# Patient Record
Sex: MALE | Race: WHITE | NOT HISPANIC OR LATINO | Employment: OTHER | URBAN - NONMETROPOLITAN AREA
[De-identification: names, ages, dates, MRNs, and addresses within clinical notes are randomized per-mention and may not be internally consistent; named-entity substitution may affect disease eponyms.]

---

## 2017-04-28 ENCOUNTER — TRANSCRIBE ORDERS (OUTPATIENT)
Dept: LAB | Facility: HOSPITAL | Age: 72
End: 2017-04-28

## 2018-04-19 ENCOUNTER — TELEPHONE (OUTPATIENT)
Dept: FAMILY MEDICINE CLINIC | Facility: CLINIC | Age: 73
End: 2018-04-19

## 2020-01-01 ENCOUNTER — APPOINTMENT (OUTPATIENT)
Dept: GENERAL RADIOLOGY | Facility: HOSPITAL | Age: 75
End: 2020-01-01

## 2020-01-01 ENCOUNTER — HOSPITAL ENCOUNTER (EMERGENCY)
Facility: HOSPITAL | Age: 75
Discharge: HOME | End: 2020-01-08

## 2020-01-01 ENCOUNTER — HOSPITAL ENCOUNTER (INPATIENT)
Facility: HOSPITAL | Age: 75
LOS: 1 days | End: 2020-01-09
Attending: EMERGENCY MEDICINE | Admitting: HOSPITALIST

## 2020-01-01 VITALS
WEIGHT: 146.83 LBS | TEMPERATURE: 99.7 F | SYSTOLIC BLOOD PRESSURE: 80 MMHG | HEIGHT: 72 IN | DIASTOLIC BLOOD PRESSURE: 44 MMHG | RESPIRATION RATE: 27 BRPM | OXYGEN SATURATION: 75 % | BODY MASS INDEX: 19.89 KG/M2

## 2020-01-01 VITALS — HEART RATE: 83 BPM | RESPIRATION RATE: 12 BRPM

## 2020-01-01 DIAGNOSIS — I95.9 HYPOTENSION, UNSPECIFIED HYPOTENSION TYPE: ICD-10-CM

## 2020-01-01 DIAGNOSIS — E87.1 HYPONATREMIA: ICD-10-CM

## 2020-01-01 DIAGNOSIS — T68.XXXA HYPOTHERMIA, INITIAL ENCOUNTER: ICD-10-CM

## 2020-01-01 DIAGNOSIS — A41.9 SEPSIS, DUE TO UNSPECIFIED ORGANISM, UNSPECIFIED WHETHER ACUTE ORGAN DYSFUNCTION PRESENT (HCC): ICD-10-CM

## 2020-01-01 DIAGNOSIS — K92.2 GASTROINTESTINAL HEMORRHAGE, UNSPECIFIED GASTROINTESTINAL HEMORRHAGE TYPE: Primary | ICD-10-CM

## 2020-01-01 LAB
ABO GROUP BLD: NORMAL
ACANTHOCYTES BLD QL SMEAR: ABNORMAL
ALBUMIN SERPL-MCNC: 1.6 G/DL (ref 3.5–5.2)
ALBUMIN/GLOB SERPL: 0.8 G/DL
ALP SERPL-CCNC: 56 U/L (ref 39–117)
ALT SERPL W P-5'-P-CCNC: 20 U/L (ref 1–41)
ANION GAP SERPL CALCULATED.3IONS-SCNC: 16 MMOL/L (ref 5–15)
ANION GAP SERPL CALCULATED.3IONS-SCNC: 20 MMOL/L (ref 5–15)
ARTERIAL PATENCY WRIST A: ABNORMAL
AST SERPL-CCNC: 21 U/L (ref 1–40)
ATMOSPHERIC PRESS: 755 MMHG
ATMOSPHERIC PRESS: 755 MMHG
ATMOSPHERIC PRESS: 757 MMHG
BACTERIA UR QL AUTO: ABNORMAL /HPF
BASE EXCESS BLDA CALC-SCNC: -10.7 MMOL/L (ref 0–2)
BASE EXCESS BLDA CALC-SCNC: -12.7 MMOL/L (ref 0–2)
BASE EXCESS BLDA CALC-SCNC: -22.5 MMOL/L (ref 0–2)
BASOPHILS # BLD AUTO: 0.02 10*3/MM3 (ref 0–0.2)
BASOPHILS NFR BLD AUTO: 0.1 % (ref 0–1.5)
BDY SITE: ABNORMAL
BILIRUB SERPL-MCNC: 0.2 MG/DL (ref 0.2–1.2)
BILIRUB UR QL STRIP: NEGATIVE
BLD GP AB SCN SERPL QL: NEGATIVE
BUN BLD-MCNC: 71 MG/DL (ref 8–23)
BUN BLD-MCNC: 82 MG/DL (ref 8–23)
BUN/CREAT SERPL: 33.6 (ref 7–25)
BUN/CREAT SERPL: 34.7 (ref 7–25)
CALCIUM SPEC-SCNC: 6.9 MG/DL (ref 8.6–10.5)
CALCIUM SPEC-SCNC: 7.8 MG/DL (ref 8.6–10.5)
CHLORIDE SERPL-SCNC: 78 MMOL/L (ref 98–107)
CHLORIDE SERPL-SCNC: 86 MMOL/L (ref 98–107)
CLARITY UR: ABNORMAL
CO2 SERPL-SCNC: 16 MMOL/L (ref 22–29)
CO2 SERPL-SCNC: 9 MMOL/L (ref 22–29)
COLOR UR: YELLOW
CREAT BLD-MCNC: 2.11 MG/DL (ref 0.76–1.27)
CREAT BLD-MCNC: 2.36 MG/DL (ref 0.76–1.27)
D-LACTATE SERPL-SCNC: 4.6 MMOL/L (ref 0.5–2)
D-LACTATE SERPL-SCNC: 7.2 MMOL/L (ref 0.5–2)
DEPRECATED RDW RBC AUTO: 36.2 FL (ref 37–54)
DEPRECATED RDW RBC AUTO: 37.6 FL (ref 37–54)
EOSINOPHIL # BLD AUTO: 0 10*3/MM3 (ref 0–0.4)
EOSINOPHIL NFR BLD AUTO: 0 % (ref 0.3–6.2)
ERYTHROCYTE [DISTWIDTH] IN BLOOD BY AUTOMATED COUNT: 11.8 % (ref 12.3–15.4)
ERYTHROCYTE [DISTWIDTH] IN BLOOD BY AUTOMATED COUNT: 12.5 % (ref 12.3–15.4)
GFR SERPL CREATININE-BSD FRML MDRD: 27 ML/MIN/1.73
GFR SERPL CREATININE-BSD FRML MDRD: 31 ML/MIN/1.73
GLOBULIN UR ELPH-MCNC: 1.9 GM/DL
GLUCOSE BLD-MCNC: 108 MG/DL (ref 65–99)
GLUCOSE BLD-MCNC: 325 MG/DL (ref 65–99)
GLUCOSE UR STRIP-MCNC: NEGATIVE MG/DL
HCO3 BLDA-SCNC: 11.8 MMOL/L (ref 20–26)
HCO3 BLDA-SCNC: 13.7 MMOL/L (ref 20–26)
HCO3 BLDA-SCNC: 8.2 MMOL/L (ref 20–26)
HCT VFR BLD AUTO: 26.7 % (ref 37.5–51)
HCT VFR BLD AUTO: 48.9 % (ref 37.5–51)
HGB BLD-MCNC: 18 G/DL (ref 13–17.7)
HGB BLD-MCNC: 9.7 G/DL (ref 13–17.7)
HGB UR QL STRIP.AUTO: ABNORMAL
HOLD SPECIMEN: NORMAL
HOROWITZ INDEX BLD+IHG-RTO: 30 %
HOROWITZ INDEX BLD+IHG-RTO: 60 %
HOROWITZ INDEX BLD+IHG-RTO: 60 %
HYALINE CASTS UR QL AUTO: ABNORMAL /LPF
IMM GRANULOCYTES # BLD AUTO: 0.45 10*3/MM3 (ref 0–0.05)
IMM GRANULOCYTES NFR BLD AUTO: 1.5 % (ref 0–0.5)
INR PPP: 1.59 (ref 0.8–1.2)
KETONES UR QL STRIP: NEGATIVE
LARGE PLATELETS: ABNORMAL
LEUKOCYTE ESTERASE UR QL STRIP.AUTO: ABNORMAL
LYMPHOCYTES # BLD AUTO: 0.36 10*3/MM3 (ref 0.7–3.1)
LYMPHOCYTES # BLD MANUAL: 1.85 10*3/MM3 (ref 0.7–3.1)
LYMPHOCYTES NFR BLD AUTO: 1.2 % (ref 19.6–45.3)
LYMPHOCYTES NFR BLD MANUAL: 5 % (ref 5–12)
LYMPHOCYTES NFR BLD MANUAL: 6 % (ref 19.6–45.3)
Lab: ABNORMAL
Lab: NORMAL
MAGNESIUM SERPL-MCNC: 1.9 MG/DL (ref 1.6–2.4)
MCH RBC QN AUTO: 30.8 PG (ref 26.6–33)
MCH RBC QN AUTO: 30.9 PG (ref 26.6–33)
MCHC RBC AUTO-ENTMCNC: 36.3 G/DL (ref 31.5–35.7)
MCHC RBC AUTO-ENTMCNC: 36.8 G/DL (ref 31.5–35.7)
MCV RBC AUTO: 83.7 FL (ref 79–97)
MCV RBC AUTO: 85 FL (ref 79–97)
METAMYELOCYTES NFR BLD MANUAL: 2 % (ref 0–0)
MODALITY: ABNORMAL
MONOCYTES # BLD AUTO: 0.77 10*3/MM3 (ref 0.1–0.9)
MONOCYTES # BLD AUTO: 1.54 10*3/MM3 (ref 0.1–0.9)
MONOCYTES NFR BLD AUTO: 2.6 % (ref 5–12)
NEUTROPHILS # BLD AUTO: 26.77 10*3/MM3 (ref 1.7–7)
NEUTROPHILS # BLD AUTO: 28.31 10*3/MM3 (ref 1.7–7)
NEUTROPHILS NFR BLD AUTO: 94.6 % (ref 42.7–76)
NEUTROPHILS NFR BLD MANUAL: 80 % (ref 42.7–76)
NEUTS BAND NFR BLD MANUAL: 7 % (ref 0–5)
NITRITE UR QL STRIP: POSITIVE
NRBC BLD AUTO-RTO: 0.1 /100 WBC (ref 0–0.2)
NT-PROBNP SERPL-MCNC: 821.2 PG/ML (ref 5–900)
PAW @ PEAK INSP FLOW SETTING VENT: 23 CMH2O
PCO2 BLDA: 25 MM HG (ref 35–45)
PCO2 BLDA: 27.1 MM HG (ref 35–45)
PCO2 BLDA: 35.8 MM HG (ref 35–45)
PEEP RESPIRATORY: 5 CM[H2O]
PH BLDA: 6.97 PH UNITS (ref 7.35–7.45)
PH BLDA: 7.28 PH UNITS (ref 7.35–7.45)
PH BLDA: 7.31 PH UNITS (ref 7.35–7.45)
PH UR STRIP.AUTO: 5.5 [PH] (ref 5–9)
PLATELET # BLD AUTO: 231 10*3/MM3 (ref 140–450)
PLATELET # BLD AUTO: 235 10*3/MM3 (ref 140–450)
PMV BLD AUTO: 9.6 FL (ref 6–12)
PMV BLD AUTO: 9.9 FL (ref 6–12)
PO2 BLDA: 126 MM HG (ref 83–108)
PO2 BLDA: 203 MM HG (ref 83–108)
PO2 BLDA: 276 MM HG (ref 83–108)
POLYCHROMASIA BLD QL SMEAR: ABNORMAL
POTASSIUM BLD-SCNC: 4.8 MMOL/L (ref 3.5–5.2)
POTASSIUM BLD-SCNC: 5.5 MMOL/L (ref 3.5–5.2)
PROT SERPL-MCNC: 3.5 G/DL (ref 6–8.5)
PROT UR QL STRIP: NEGATIVE
PROTHROMBIN TIME: 18.7 SECONDS (ref 11.1–15.3)
RBC # BLD AUTO: 3.14 10*6/MM3 (ref 4.14–5.8)
RBC # BLD AUTO: 5.84 10*6/MM3 (ref 4.14–5.8)
RBC # UR: ABNORMAL /HPF
REF LAB TEST METHOD: ABNORMAL
RH BLD: POSITIVE
SAO2 % BLDCOA: 98.4 % (ref 94–99)
SAO2 % BLDCOA: 98.7 % (ref 94–99)
SAO2 % BLDCOA: 99.7 % (ref 94–99)
SET MECH RESP RATE: 12
SODIUM BLD-SCNC: 107 MMOL/L (ref 136–145)
SODIUM BLD-SCNC: 118 MMOL/L (ref 136–145)
SP GR UR STRIP: 1.02 (ref 1–1.03)
SQUAMOUS #/AREA URNS HPF: ABNORMAL /HPF
T&S EXPIRATION DATE: NORMAL
TROPONIN T SERPL-MCNC: 0.01 NG/ML (ref 0–0.03)
UROBILINOGEN UR QL STRIP: ABNORMAL
VENTILATOR MODE: AC
VT ON VENT VENT: 500 ML
VT ON VENT VENT: 500 ML
WBC MORPH BLD: NORMAL
WBC NRBC COR # BLD: 29.91 10*3/MM3 (ref 3.4–10.8)
WBC NRBC COR # BLD: 30.77 10*3/MM3 (ref 3.4–10.8)
WBC UR QL AUTO: ABNORMAL /HPF
WHOLE BLOOD HOLD SPECIMEN: NORMAL
WHOLE BLOOD HOLD SPECIMEN: NORMAL
YEAST URNS QL MICRO: ABNORMAL /HPF

## 2020-01-01 PROCEDURE — 36600 WITHDRAWAL OF ARTERIAL BLOOD: CPT

## 2020-01-01 PROCEDURE — 85025 COMPLETE CBC W/AUTO DIFF WBC: CPT | Performed by: HOSPITALIST

## 2020-01-01 PROCEDURE — 85007 BL SMEAR W/DIFF WBC COUNT: CPT | Performed by: EMERGENCY MEDICINE

## 2020-01-01 PROCEDURE — 86850 RBC ANTIBODY SCREEN: CPT | Performed by: EMERGENCY MEDICINE

## 2020-01-01 PROCEDURE — 87181 SC STD AGAR DILUTION PER AGT: CPT | Performed by: HOSPITALIST

## 2020-01-01 PROCEDURE — 82803 BLOOD GASES ANY COMBINATION: CPT

## 2020-01-01 PROCEDURE — 87205 SMEAR GRAM STAIN: CPT | Performed by: HOSPITALIST

## 2020-01-01 PROCEDURE — 25010000002 MIDAZOLAM 50 MG/10ML SOLUTION 10 ML VIAL: Performed by: HOSPITALIST

## 2020-01-01 PROCEDURE — 85025 COMPLETE CBC W/AUTO DIFF WBC: CPT | Performed by: EMERGENCY MEDICINE

## 2020-01-01 PROCEDURE — 94762 N-INVAS EAR/PLS OXIMTRY CONT: CPT

## 2020-01-01 PROCEDURE — 94799 UNLISTED PULMONARY SVC/PX: CPT

## 2020-01-01 PROCEDURE — 25010000002 CEFTRIAXONE PER 250 MG: Performed by: EMERGENCY MEDICINE

## 2020-01-01 PROCEDURE — 25010000002 EPINEPHRINE PF 1 MG/10ML SOLUTION PREFILLED SYRINGE: Performed by: INTERNAL MEDICINE

## 2020-01-01 PROCEDURE — 87070 CULTURE OTHR SPECIMN AEROBIC: CPT | Performed by: HOSPITALIST

## 2020-01-01 PROCEDURE — 80053 COMPREHEN METABOLIC PANEL: CPT | Performed by: EMERGENCY MEDICINE

## 2020-01-01 PROCEDURE — 87086 URINE CULTURE/COLONY COUNT: CPT | Performed by: EMERGENCY MEDICINE

## 2020-01-01 PROCEDURE — 86920 COMPATIBILITY TEST SPIN: CPT

## 2020-01-01 PROCEDURE — P9016 RBC LEUKOCYTES REDUCED: HCPCS

## 2020-01-01 PROCEDURE — 93010 ELECTROCARDIOGRAM REPORT: CPT | Performed by: INTERNAL MEDICINE

## 2020-01-01 PROCEDURE — 25010000002 ATROPINE PER 0.01 MG: Performed by: INTERNAL MEDICINE

## 2020-01-01 PROCEDURE — 36430 TRANSFUSION BLD/BLD COMPNT: CPT

## 2020-01-01 PROCEDURE — 93005 ELECTROCARDIOGRAM TRACING: CPT | Performed by: EMERGENCY MEDICINE

## 2020-01-01 PROCEDURE — 86900 BLOOD TYPING SEROLOGIC ABO: CPT

## 2020-01-01 PROCEDURE — 80048 BASIC METABOLIC PNL TOTAL CA: CPT | Performed by: HOSPITALIST

## 2020-01-01 PROCEDURE — 86900 BLOOD TYPING SEROLOGIC ABO: CPT | Performed by: EMERGENCY MEDICINE

## 2020-01-01 PROCEDURE — 85610 PROTHROMBIN TIME: CPT | Performed by: EMERGENCY MEDICINE

## 2020-01-01 PROCEDURE — 83735 ASSAY OF MAGNESIUM: CPT | Performed by: EMERGENCY MEDICINE

## 2020-01-01 PROCEDURE — 99285 EMERGENCY DEPT VISIT HI MDM: CPT

## 2020-01-01 PROCEDURE — 92950 HEART/LUNG RESUSCITATION CPR: CPT

## 2020-01-01 PROCEDURE — 87186 SC STD MICRODIL/AGAR DIL: CPT | Performed by: HOSPITALIST

## 2020-01-01 PROCEDURE — 25010000002 LORAZEPAM PER 2 MG

## 2020-01-01 PROCEDURE — 25010000002 MORPHINE PER 10 MG: Performed by: HOSPITALIST

## 2020-01-01 PROCEDURE — 81001 URINALYSIS AUTO W/SCOPE: CPT | Performed by: EMERGENCY MEDICINE

## 2020-01-01 PROCEDURE — 25010000002 AMIODARONE PER 30 MG: Performed by: INTERNAL MEDICINE

## 2020-01-01 PROCEDURE — 86901 BLOOD TYPING SEROLOGIC RH(D): CPT | Performed by: EMERGENCY MEDICINE

## 2020-01-01 PROCEDURE — 5A1935Z RESPIRATORY VENTILATION, LESS THAN 24 CONSECUTIVE HOURS: ICD-10-PCS | Performed by: HOSPITALIST

## 2020-01-01 PROCEDURE — 86922 COMPATIBILITY TEST ANTIGLOB: CPT

## 2020-01-01 PROCEDURE — 84484 ASSAY OF TROPONIN QUANT: CPT | Performed by: EMERGENCY MEDICINE

## 2020-01-01 PROCEDURE — P9612 CATHETERIZE FOR URINE SPEC: HCPCS

## 2020-01-01 PROCEDURE — 83880 ASSAY OF NATRIURETIC PEPTIDE: CPT | Performed by: EMERGENCY MEDICINE

## 2020-01-01 PROCEDURE — 87040 BLOOD CULTURE FOR BACTERIA: CPT | Performed by: HOSPITALIST

## 2020-01-01 PROCEDURE — 5A12012 PERFORMANCE OF CARDIAC OUTPUT, SINGLE, MANUAL: ICD-10-PCS | Performed by: INTERNAL MEDICINE

## 2020-01-01 PROCEDURE — 83605 ASSAY OF LACTIC ACID: CPT | Performed by: EMERGENCY MEDICINE

## 2020-01-01 PROCEDURE — 94002 VENT MGMT INPAT INIT DAY: CPT

## 2020-01-01 PROCEDURE — 94770: CPT

## 2020-01-01 PROCEDURE — 87150 DNA/RNA AMPLIFIED PROBE: CPT | Performed by: HOSPITALIST

## 2020-01-01 PROCEDURE — 25010000002 LORAZEPAM PER 2 MG: Performed by: HOSPITALIST

## 2020-01-01 PROCEDURE — 71045 X-RAY EXAM CHEST 1 VIEW: CPT

## 2020-01-01 PROCEDURE — 25010000002 EPINEPHRINE 1 MG/ML SOLUTION 30 ML VIAL: Performed by: INTERNAL MEDICINE

## 2020-01-01 PROCEDURE — 99233 SBSQ HOSP IP/OBS HIGH 50: CPT | Performed by: INTERNAL MEDICINE

## 2020-01-01 RX ORDER — MORPHINE SULFATE 2 MG/ML
2 INJECTION, SOLUTION INTRAMUSCULAR; INTRAVENOUS
Status: DISCONTINUED | OUTPATIENT
Start: 2020-01-01 | End: 2020-01-01 | Stop reason: HOSPADM

## 2020-01-01 RX ORDER — LORAZEPAM 2 MG/ML
2 INJECTION INTRAMUSCULAR EVERY 4 HOURS PRN
Status: DISCONTINUED | OUTPATIENT
Start: 2020-01-01 | End: 2020-01-01 | Stop reason: HOSPADM

## 2020-01-01 RX ORDER — LORAZEPAM 2 MG/ML
2 INJECTION INTRAMUSCULAR ONCE
Status: COMPLETED | OUTPATIENT
Start: 2020-01-01 | End: 2020-01-01

## 2020-01-01 RX ORDER — SCOLOPAMINE TRANSDERMAL SYSTEM 1 MG/1
1 PATCH, EXTENDED RELEASE TRANSDERMAL
Status: DISCONTINUED | OUTPATIENT
Start: 2020-01-01 | End: 2020-01-01 | Stop reason: HOSPADM

## 2020-01-01 RX ORDER — DOPAMINE HYDROCHLORIDE 160 MG/100ML
2-20 INJECTION, SOLUTION INTRAVENOUS
Status: DISCONTINUED | OUTPATIENT
Start: 2020-01-01 | End: 2020-01-01

## 2020-01-01 RX ORDER — ATROPINE SULFATE 1 MG/ML
INJECTION, SOLUTION INTRAMUSCULAR; INTRAVENOUS; SUBCUTANEOUS
Status: COMPLETED | OUTPATIENT
Start: 2020-01-01 | End: 2020-01-01

## 2020-01-01 RX ORDER — NOREPINEPHRINE BIT/0.9 % NACL 8 MG/250ML
.02-.3 INFUSION BOTTLE (ML) INTRAVENOUS
Status: DISCONTINUED | OUTPATIENT
Start: 2020-01-01 | End: 2020-01-01 | Stop reason: HOSPADM

## 2020-01-01 RX ORDER — HALOPERIDOL 5 MG/ML
1 INJECTION INTRAMUSCULAR EVERY 6 HOURS PRN
Status: DISCONTINUED | OUTPATIENT
Start: 2020-01-01 | End: 2020-01-01 | Stop reason: HOSPADM

## 2020-01-01 RX ORDER — SODIUM CHLORIDE 0.9 % (FLUSH) 0.9 %
10 SYRINGE (ML) INJECTION AS NEEDED
Status: DISCONTINUED | OUTPATIENT
Start: 2020-01-01 | End: 2020-01-01 | Stop reason: HOSPADM

## 2020-01-01 RX ORDER — SODIUM CHLORIDE 9 MG/ML
150 INJECTION, SOLUTION INTRAVENOUS CONTINUOUS
Status: DISCONTINUED | OUTPATIENT
Start: 2020-01-01 | End: 2020-01-01 | Stop reason: HOSPADM

## 2020-01-01 RX ORDER — LORAZEPAM 2 MG/ML
INJECTION INTRAMUSCULAR
Status: COMPLETED
Start: 2020-01-01 | End: 2020-01-01

## 2020-01-01 RX ORDER — PANTOPRAZOLE SODIUM 40 MG/10ML
80 INJECTION, POWDER, LYOPHILIZED, FOR SOLUTION INTRAVENOUS ONCE
Status: COMPLETED | OUTPATIENT
Start: 2020-01-01 | End: 2020-01-01

## 2020-01-01 RX ORDER — SODIUM CHLORIDE 0.9 % (FLUSH) 0.9 %
10 SYRINGE (ML) INJECTION EVERY 12 HOURS SCHEDULED
Status: DISCONTINUED | OUTPATIENT
Start: 2020-01-01 | End: 2020-01-01 | Stop reason: HOSPADM

## 2020-01-01 RX ORDER — AMIODARONE HYDROCHLORIDE 50 MG/ML
INJECTION, SOLUTION INTRAVENOUS
Status: COMPLETED | OUTPATIENT
Start: 2020-01-01 | End: 2020-01-01

## 2020-01-01 RX ADMIN — EPINEPHRINE 1 MG: 0.1 INJECTION INTRACARDIAC; INTRAVENOUS at 07:14

## 2020-01-01 RX ADMIN — SODIUM BICARBONATE 50 MEQ: 84 INJECTION INTRAVENOUS at 07:49

## 2020-01-01 RX ADMIN — EPINEPHRINE 1 MG: 0.1 INJECTION INTRACARDIAC; INTRAVENOUS at 09:14

## 2020-01-01 RX ADMIN — SODIUM CHLORIDE, PRESERVATIVE FREE 10 ML: 5 INJECTION INTRAVENOUS at 20:30

## 2020-01-01 RX ADMIN — EPINEPHRINE 0.3 MCG/KG/MIN: 1 INJECTION PARENTERAL at 10:09

## 2020-01-01 RX ADMIN — SODIUM CHLORIDE 150 ML/HR: 900 INJECTION, SOLUTION INTRAVENOUS at 08:50

## 2020-01-01 RX ADMIN — PANTOPRAZOLE SODIUM 80 MG: 40 INJECTION, POWDER, FOR SOLUTION INTRAVENOUS at 17:36

## 2020-01-01 RX ADMIN — LORAZEPAM 2 MG: 2 INJECTION INTRAMUSCULAR; INTRAVENOUS at 17:24

## 2020-01-01 RX ADMIN — EPINEPHRINE 1 MG: 0.1 INJECTION INTRACARDIAC; INTRAVENOUS at 07:10

## 2020-01-01 RX ADMIN — SODIUM BICARBONATE 50 MEQ: 84 INJECTION INTRAVENOUS at 17:36

## 2020-01-01 RX ADMIN — LORAZEPAM 2 MG: 2 INJECTION INTRAMUSCULAR at 17:24

## 2020-01-01 RX ADMIN — ATROPINE SULFATE 1 MG: 1 INJECTION, SOLUTION INTRAMUSCULAR; INTRAVENOUS; SUBCUTANEOUS at 07:22

## 2020-01-01 RX ADMIN — LORAZEPAM 2 MG: 2 INJECTION INTRAMUSCULAR at 17:00

## 2020-01-01 RX ADMIN — MIDAZOLAM 8 MG/HR: 5 INJECTION INTRAMUSCULAR; INTRAVENOUS at 06:02

## 2020-01-01 RX ADMIN — SODIUM CHLORIDE 150 ML/HR: 900 INJECTION, SOLUTION INTRAVENOUS at 23:52

## 2020-01-01 RX ADMIN — SODIUM CHLORIDE 150 ML/HR: 900 INJECTION, SOLUTION INTRAVENOUS at 03:22

## 2020-01-01 RX ADMIN — EPINEPHRINE 1 MG: 0.1 INJECTION INTRACARDIAC; INTRAVENOUS at 07:07

## 2020-01-01 RX ADMIN — MIDAZOLAM 1 MG/HR: 5 INJECTION INTRAMUSCULAR; INTRAVENOUS at 20:25

## 2020-01-01 RX ADMIN — LORAZEPAM 2 MG: 2 INJECTION, SOLUTION INTRAMUSCULAR; INTRAVENOUS at 17:00

## 2020-01-01 RX ADMIN — SODIUM BICARBONATE 50 MEQ: 84 INJECTION INTRAVENOUS at 07:14

## 2020-01-01 RX ADMIN — CEFTRIAXONE SODIUM 1 G: 1 INJECTION, POWDER, FOR SOLUTION INTRAMUSCULAR; INTRAVENOUS at 18:15

## 2020-01-01 RX ADMIN — LORAZEPAM 2 MG: 2 INJECTION, SOLUTION INTRAMUSCULAR; INTRAVENOUS at 11:57

## 2020-01-01 RX ADMIN — SODIUM CHLORIDE 150 ML/HR: 900 INJECTION, SOLUTION INTRAVENOUS at 10:57

## 2020-01-01 RX ADMIN — EPINEPHRINE 0.3 MCG/KG/MIN: 1 INJECTION PARENTERAL at 07:44

## 2020-01-01 RX ADMIN — SODIUM CHLORIDE 2000 ML: 900 INJECTION, SOLUTION INTRAVENOUS at 17:29

## 2020-01-01 RX ADMIN — LORAZEPAM 2 MG: 2 INJECTION INTRAMUSCULAR at 17:41

## 2020-01-01 RX ADMIN — Medication 10 ML: at 17:26

## 2020-01-01 RX ADMIN — NOREPINEPHRINE BITARTRATE 0.02 MCG/KG/MIN: 1 INJECTION, SOLUTION, CONCENTRATE INTRAVENOUS at 22:06

## 2020-01-01 RX ADMIN — AMIODARONE HYDROCHLORIDE 150 MG: 50 INJECTION, SOLUTION INTRAVENOUS at 07:20

## 2020-01-01 RX ADMIN — MORPHINE SULFATE 2 MG: 2 INJECTION, SOLUTION INTRAMUSCULAR; INTRAVENOUS at 11:57

## 2020-01-01 RX ADMIN — EPINEPHRINE 1 MG: 0.1 INJECTION INTRACARDIAC; INTRAVENOUS at 07:47

## 2020-01-01 RX ADMIN — EPINEPHRINE 1 MG: 0.1 INJECTION INTRACARDIAC; INTRAVENOUS at 07:12

## 2020-01-01 RX ADMIN — NOREPINEPHRINE BITARTRATE 0.3 MCG/KG/MIN: 1 INJECTION, SOLUTION, CONCENTRATE INTRAVENOUS at 07:05

## 2020-01-01 RX ADMIN — EPINEPHRINE 1 MG: 0.1 INJECTION INTRACARDIAC; INTRAVENOUS at 07:22

## 2020-01-01 RX ADMIN — SODIUM CHLORIDE 150 ML/HR: 900 INJECTION, SOLUTION INTRAVENOUS at 18:09

## 2020-01-01 RX ADMIN — LORAZEPAM 2 MG: 2 INJECTION, SOLUTION INTRAMUSCULAR; INTRAVENOUS at 17:41

## 2020-01-08 PROBLEM — K92.2 GASTROINTESTINAL HEMORRHAGE: Status: ACTIVE | Noted: 2020-01-01

## 2020-01-08 NOTE — ED PROVIDER NOTES
Subjective   Patient presents emergency department and cardiac arrest today.  Patient has evidently per the family been having problems with GI bleeding over the last 24 hours.  Patient has a colostomy placed secondary to ulcerative colitis with prior colectomy.  Patient is not had problems with this in the past.  The family notes dark stools with some blood in the bag, copious quantity over the last 24 hours per the family.  The patient has become increasingly pale and weak per the family.  The patient was dizzy and presyncopal with attempts at ambulation was too weak to get out of bed.  EMS was called.  EMS initially went to the house and the patient refused all care even getting a blood pressure.  Patient was oriented and capable of making his own decisions.  The family was told that his situation changes to call them again.  Approximately an hour to the patient became less responsive and EMS was called again.  The patient by the time of EMS arrival was unresponsive and the EMS providers noted the patient was in PEA.  Patient was immediately put on the gurney and taken to the emergency department with CPR initiated.  Patient did have return of spontaneous circulation in the ambulance though the blood pressure was minimal.  Patient was started on dopamine drip.  Patient presents emergency department with minimal responsiveness and intubated.          Review of Systems   Unable to perform ROS: Patient nonverbal   Constitutional: Positive for fatigue.   Gastrointestinal: Positive for blood in stool.   Skin: Positive for pallor.   Neurological: Positive for dizziness, weakness and light-headedness.       Past Medical History:   Diagnosis Date   • Actinic keratosis    • Acute sinusitis    • Anxiety state    • Benign prostate hyperplasia     s/p TURP   • Blood in urine     microscopic, UA dipstick done in office   • Chronic pain     previously controlled with Neurontin   • Chronic prostatitis    • Chronic rhinitis    •  Chronic sinusitis    • Cobalamin deficiency     starting b12 today recheck weekly 4 weeks   • Colostomy present (CMS/[facility])    • Depressive disorder    • Disc disorder of lumbar region    • Disease of prostate     no $ for f/u   • Dysuria     check a ua   • Insomnia    • Kidney stone    • Low back pain    • Malignant tumor of prostate (CMS/[facility])     with surger at [facility]   • Nausea and vomiting    • Neoplasm of uncertain behavior of skin    • Neuralgia and neuritis     Neuralgia, neuritis, and radiculitis, unspecified   • Neuropathy    • Osteoarthritis     multiple sites   • Pain in wrist    • Pelvic congestion syndrome    • Prostatitis    • Radiculitis    • Spinal stenosis    • Tobacco dependence syndrome    • Urinary incontinence     s/p turp   • Urinary tract infectious disease        No Known Allergies    Past Surgical History:   Procedure Laterality Date   • COLOSTOMY     • CYSTOSCOPY TRANSURETHRAL RESECTION OF PROSTATE  03/03/2012   • FINGER SURGERY  08/06/2015    Arthroplasty with Orthosphere ceramic spherical implant size 13 mm. Arthritis base of thumb joint   • INJECTION OF MEDICATION      Pheneragan (1)   • INJECTION OF MEDICATION  09/02/2011    Rocephin (1)   • INJECTION OF MEDICATION      Toradol (3)       Family History   Adopted: Yes       Social History     Socioeconomic History   • Marital status:      Spouse name: Not on file   • Number of children: Not on file   • Years of education: Not on file   • Highest education level: Not on file   Tobacco Use   • Smoking status: Current Every Day Smoker   Substance and Sexual Activity   • Alcohol use: No           Objective   Physical Exam   Constitutional: He is oriented to person, place, and time.   Pale, minimally responsive, palpable carotid and radial blood pressures.   HENT:   Head: Normocephalic and atraumatic.   Neck: Normal range of motion. Neck supple. No JVD present.   Cardiovascular: Normal rate, regular rhythm, normal heart sounds and  intact distal pulses. Exam reveals no gallop and no friction rub.   No murmur heard.  Pulmonary/Chest: Effort normal. No respiratory distress. He has no wheezes. He has no rales. He exhibits no tenderness.   Abdominal: Soft. Bowel sounds are normal. He exhibits no distension and no mass. There is no tenderness. There is no rebound and no guarding.   Musculoskeletal: Normal range of motion.   Neurological: He is alert and oriented to person, place, and time.   Skin: Skin is warm and dry.   Psychiatric: He has a normal mood and affect. His behavior is normal. Judgment and thought content normal.   Nursing note and vitals reviewed.      Procedures           ED Course                                       Labs Reviewed   COMPREHENSIVE METABOLIC PANEL - Abnormal; Notable for the following components:       Result Value    Glucose 325 (*)     BUN 82 (*)     Creatinine 2.36 (*)     Sodium 107 (*)     Chloride 78 (*)     CO2 9.0 (*)     Calcium 6.9 (*)     Total Protein 3.5 (*)     Albumin 1.60 (*)     eGFR Non African Amer 27 (*)     BUN/Creatinine Ratio 34.7 (*)     Anion Gap 20.0 (*)     All other components within normal limits    Narrative:     GFR Normal >60  Chronic Kidney Disease <60  Kidney Failure <15     PROTIME-INR - Abnormal; Notable for the following components:    Protime 18.7 (*)     INR 1.59 (*)     All other components within normal limits    Narrative:     Therapeutic range for most indications is 2.0-3.0 INR,  or 2.5-3.5 for mechanical heart valves.   LACTIC ACID, PLASMA - Abnormal; Notable for the following components:    Lactate 7.2 (*)     All other components within normal limits   URINALYSIS W/ CULTURE IF INDICATED - Abnormal; Notable for the following components:    Appearance, UA Cloudy (*)     Blood, UA Large (3+) (*)     Leuk Esterase, UA Moderate (2+) (*)     Nitrite, UA Positive (*)     All other components within normal limits   CBC WITH AUTO DIFFERENTIAL - Abnormal; Notable for the following  components:    WBC 30.77 (*)     RBC 3.14 (*)     Hemoglobin 9.7 (*)     Hematocrit 26.7 (*)     MCHC 36.3 (*)     RDW 11.8 (*)     RDW-SD 36.2 (*)     All other components within normal limits   BLOOD GAS, ARTERIAL - Abnormal; Notable for the following components:    pH, Arterial 6.967 (*)     pO2, Arterial 203.0 (*)     HCO3, Arterial 8.2 (*)     Base Excess, Arterial -22.5 (*)     All other components within normal limits   URINALYSIS, MICROSCOPIC ONLY - Abnormal; Notable for the following components:    RBC, UA 21-30 (*)     WBC, UA 31-50 (*)     Bacteria, UA 1+ (*)     All other components within normal limits   MANUAL DIFFERENTIAL - Abnormal; Notable for the following components:    Neutrophil % 80.0 (*)     Lymphocyte % 6.0 (*)     Bands %  7.0 (*)     Metamyelocyte % 2.0 (*)     Neutrophils Absolute 26.77 (*)     Monocytes Absolute 1.54 (*)     All other components within normal limits   TROPONIN (IN-HOUSE) - Normal    Narrative:     Troponin T Reference Range:  <= 0.03 ng/mL-   Negative for AMI  >0.03 ng/mL-     Abnormal for myocardial necrosis.  Clinicians would have to utilize clinical acumen, EKG, Troponin and serial changes to determine if it is an Acute Myocardial Infarction or myocardial injury due to an underlying chronic condition.       Results may be falsely decreased if patient taking Biotin.     BNP (IN-HOUSE) - Normal    Narrative:     Among patients with dyspnea, NT-proBNP is highly sensitive for the detection of acute congestive heart failure. In addition NT-proBNP of <300 pg/ml effectively rules out acute congestive heart failure with 99% negative predictive value.    Results may be falsely decreased if patient taking Biotin.     MAGNESIUM - Normal   RESPIRATORY CULTURE   URINE CULTURE   BLOOD CULTURE   BLOOD CULTURE   RAINBOW DRAW    Narrative:     The following orders were created for panel order [place] Draw.  Procedure                               Abnormality         Status                      ---------                               -----------         ------                     Light Blue Top[[id number]]                                   Final result               Green Top (Gel)[[id number]]                                  Final result               Lavender Top[[id number]]                                     Final result               Gold Top - SST[[id number]]                                   Final result                 Please view results for these tests on the individual orders.   BLOOD GAS, ARTERIAL   LACTIC ACID REFLEX TIMER   POCT GLUCOSE FINGERSTICK   TYPE AND SCREEN   PREVIOUS HISTORY   PREPARE RBC   CBC AND DIFFERENTIAL    Narrative:     The following orders were created for panel order CBC & Differential.  Procedure                               Abnormality         Status                     ---------                               -----------         ------                     CBC Auto Differential[[id number]]        Abnormal            Final result                 Please view results for these tests on the individual orders.   LIGHT BLUE TOP   GREEN TOP   LAVENDER TOP   GOLD TOP - SST       XR Chest 1 View   Final Result   CONCLUSION:   Endotracheal tube tip projects 4 cm above the makeda.      Electronically signed by:  Cabrera Jaimes MD  1/8/2020 5:28 PM CST   Workstation: [id number]      Patient with pallor and hypotension presumably from the GI bleeding the family notes is the acute issue over the last 24 hours.  On return of the blood work the hemoglobin is 9.7 with prior hemoglobins in the 14 range in the last 3 months.  Unsure of lower with the speed of onset of the patient's symptoms.  Patient was given 2 units of emergency release which raised his map towards the 60s.  Patient has continue light on the ventilator with bicarb supplementation secondary to his metabolic acidosis thought secondary to his cardiac arrest.  There is been no further cardiac arrest in the emergency  department.  Patient has had approximately 3 L of fluid, as well as 2 units of blood is noted.  Plan further care in the ICU with GI consultation.          MDM    Final diagnoses:   Gastrointestinal hemorrhage, unspecified gastrointestinal hemorrhage type   Hypotension, unspecified hypotension type   Hyponatremia   Sepsis, due to unspecified organism, unspecified whether acute organ dysfunction present (CMS/[facility])   Hypothermia, initial encounter            Pascual Ruiz MD  01/08/20 2862

## 2020-01-09 NOTE — ED NOTES
FSBG 371; Pt has ileostomy with no collection bag in place, clothes and body soiled  Juan Fernández RN  01/08/20 1854       Juan Fernández RN  01/08/20 1859

## 2020-01-09 NOTE — PLAN OF CARE
Problem: Ventilation, Mechanical Invasive (Adult)  Goal: Signs and Symptoms of Listed Potential Problems Will be Absent, Minimized or Managed (Ventilation, Mechanical Invasive)  Outcome: Ongoing (interventions implemented as appropriate)   Pt tolerating current vent settings, notable elevated RR, will continue to monitor closely.

## 2020-01-09 NOTE — PROGRESS NOTES
[facility] Medicine Services  INPATIENT PROGRESS NOTE    Length of Stay: 1  Date of Admission: 1/8/2020  Primary Care Physician: Dayday Dent MD    Subjective   Chief Complaint: cardiac arrest  HPI:  Intubated and sedated    Review of Systems   Unable to perform ROS: Intubated          Objective    Temp:  [91.1 °F (32.8 °C)-99.7 °F (37.6 °C)] 99.7 °F (37.6 °C)  Heart Rate:  [0-125] 0  Resp:  [12-49] 28  BP: ()/(16-95) 84/50  FiO2 (%):  [30 %-100 %] 100 %    Vent Settings:  FiO2 (%):  [30 %-100 %] 100 %  S RR:  [12] 12  PEEP/CPAP (cm H2O):  [5 cm H20] 5 cm H20  GA SUP:  [0 cm H20] 0 cm H20  MAP (cm H2O):  [7.9-14] 14    Physical Exam   Constitutional: He appears well-developed and well-nourished. He is sedated and intubated.   HENT:   Head: Normocephalic and atraumatic.   Eyes: Pupils are equal, round, and reactive to light. EOM are normal. No scleral icterus.   Neck: Normal range of motion. Neck supple. No tracheal deviation present. No thyromegaly present.   Cardiovascular: Normal rate, regular rhythm, normal heart sounds and intact distal pulses. Exam reveals no gallop and no friction rub.   No murmur heard.  Pulmonary/Chest: Effort normal. He is intubated. No respiratory distress. He has decreased breath sounds. He has no wheezes. He has no rales. He exhibits no tenderness.   Abdominal: Soft. Bowel sounds are normal. He exhibits no distension. There is no tenderness.   Patient with colostomy.  Dark liquid bloody stool coming out.   Neurological:   Intubated and sedated   Skin: There is pallor.   Psychiatric:   Intubated and sedated   Vitals reviewed.          Results Review:  I have reviewed the labs, radiology results, and diagnostic studies.    Laboratory Data:   Results from last 7 days   Lab Units 01/09/20  0326 01/08/20  1720   SODIUM mmol/L 118* 107*   POTASSIUM mmol/L 5.5* 4.8   CHLORIDE mmol/L 86* 78*   CO2 mmol/L 16.0* 9.0*   BUN mg/dL 71* 82*    CREATININE mg/dL 2.11* 2.36*   GLUCOSE mg/dL 108* 325*   CALCIUM mg/dL 7.8* 6.9*   BILIRUBIN mg/dL  --  0.2   ALK PHOS U/L  --  56   ALT (SGPT) U/L  --  20   AST (SGOT) U/L  --  21   ANION GAP mmol/L 16.0* 20.0*     Estimated Creatinine Clearance: 29.4 mL/min (A) (by C-G formula based on SCr of 2.11 mg/dL (H)).  Results from last 7 days   Lab Units 01/08/20  1720   MAGNESIUM mg/dL 1.9         Results from last 7 days   Lab Units 01/09/20  0326 01/08/20  1720   WBC 10*3/mm3 29.91* 30.77*   HEMOGLOBIN g/dL 18.0* 9.7*   HEMATOCRIT % 48.9 26.7*   PLATELETS 10*3/mm3 231 235     Results from last 7 days   Lab Units 01/08/20  1720   INR  1.59*       Culture Data:   No results found for: BLOODCX  Urine Culture   Date Value Ref Range Status   01/08/2020 No growth  Preliminary     No results found for: RESPCX  No results found for: WOUNDCX  No results found for: STOOLCX  No components found for: BODYFLD    Radiology Data:   Imaging Results (Last 24 Hours)     Procedure Component Value Units Date/Time    XR Chest 1 View [[id number]] Collected:  01/08/20 1715     Updated:  01/08/20 1733    Narrative:       PROCEDURE: Portable chest x-ray    TECHNIQUE: Single AP view of the chest    COMPARISON: 7/13/2010    HISTORY: Intubated    FINDINGS:     Life-support devices: Endotracheal tube tip projects  approximately 4 cm above the makeda. A defibrillator pad projects  over the right side of the chest.    Lungs/pleura: The lung apices are not included on the film. The  visualized portions of the lungs appear clear. No pneumothorax or  pleural effusion.    Heart, hilar and mediastinal structures: Cardiac silhouette is  normal in size. Thoracic aorta contains atherosclerotic  calcification.      Impression:       CONCLUSION:  Endotracheal tube tip projects 4 cm above the makeda.    Electronically signed by:  Cabrera Jaimes MD  1/8/2020 5:28 PM CST  Workstation: [id number]          ABG:  Results from last 7 days   Lab Units 01/09/20  0430    PH, ARTERIAL pH units 7.283*   PO2 ART mm Hg 126.0*   PCO2, ARTERIAL mm Hg 25.0*   HCO3 ART mmol/L 11.8*       I have reviewed the patient's current medications.     Assessment/Plan     Active Hospital Problems    Diagnosis   • Gastrointestinal hemorrhage       Plan:  1.  Cardiac arrest: Patient had PEA cardiac arrest last night and then twice this morning..  He has returned to spontaneous circulation.  Currently he is on an epinephrine drip.  Patient remains extremely hemodynamically unstable.  Blood pressure is marginal on maximum dose of norepinephrine as well as epinephrine.  2.  GI bleed.  Patient has dark black blood in his colostomy bag.  He has gotten 2 units packed red blood cells.  Hemoglobin was greater than 9.    Repeat hemoglobin this morning was 18, which is clearly erroneous.  3.  Severe metabolic acidosis with a pH of 6.9.  Patient received 1 amp of sodium bicarbonate.  Will need tomorrow.  We will give those tonight.  4.  Severe hyponatremia: Sodium is 118.  Unclear etiology.    5.  Possible urinary tract infection: This is likely not the cause of his current situation, but will get culture and treat empirically nonetheless.  6.  Elevated lactate: Likely secondary to poor tissue perfusion during his arrest and iman-arrest hypotension.  7.  Probable hypoxic encephalopathy: This is almost certain at this point given his to further cardiac arrests.    Prognosis is grim.  This is been discussed with patient's daughter and granddaughters.  They are likely to transition the patient to comfort measures.      Approximately 85 minutes of critical care time were spent managing the patient exclusive of billable procedures.           Discharge Planning: I expect patient to be transitioned to comfort measures and to  today.        This document has been electronically signed by Eduin Quevedo MD on 2020 11:44 AM

## 2020-01-09 NOTE — PROGRESS NOTES
01/09/20    Code Blue Note:  Code Blue was called about 070 5 AM.  Initial rhythm was PEA.  Full ACLS protocol was performed.  For details please see Code Blue documentation.  In summary patient was admitted for cardiac arrest and noted to be markedly hypothermic.  He has remained intubated and on vasopressor.    Outcome: Successful.  Family was notified.  Time Spent: 25 minutes      Rodolfo Stahl MD   01/09/20   7:46 AM

## 2020-01-09 NOTE — ED NOTES
Pt arrives via EMS post arrest. EMS originally called to scene earlier today concerning bleeding from ileostomy. Pt was alert and oriented at the time and refused transport to hospital. Family called EMS again around 1620 stating pt had collapsed. Once on scene, Medic assessed pt and noted him to be in PEA. CPR was initiated and 1 mg epinepherine was given IV per ACLS guidelines. ROSC achieved and pt intubated in field per Medic. IV dopamine was also administered at an unknown rate until arrival at ED. Dopamine placed on pump and was initially running at 5 mcg/kg/min, but pt's blood pressure began improving and MD MARY ordered to hold dopamine in lieu of NS and PRBC bolus.      Juan Fernández RN  01/08/20 1911

## 2020-01-09 NOTE — NURSING NOTE
Antonia from [facility] called back and stated that pt was not a potential donor - call [facility] back to report TOD

## 2020-01-09 NOTE — H&P
[facility] Admission      Date of Admission: 1/8/2020      Primary Care Physician: Dayday Dent MD      Chief Complaint: Cardiac arrest    HPI: Patient presented in the emergency department post cardiac arrest.  Apparently he was feeling poorly at home earlier in the day and his family called EMS.  He refused to go to the hospital with EMS.  At that point he was reported to be alert and oriented x4.  Less than an hour later, he was unresponsive.  EMS was called back to his home and he was found to be in PEA.  ACLS protocol was undertaken, and he had return of spontaneous circulation.  During this whole time he had bleeding from his colostomy.  Upon arrival to the emergency department he was found to be markedly hypothermic.  He was noted to be quite pale as well.  Initially he was also very hypotensive.  He was very quickly given 3 L of normal saline and 2 units of packed red blood cells.  This helped his blood pressure stabilized to some degree.  He did also require some degree of sedation.  He reacted to the NG tube being placed.  He was noted to be breathing over the ventilator.  No further information was available.    Concurrent Medical History:  has a past medical history of Actinic keratosis, Acute sinusitis, Anxiety state, Benign prostate hyperplasia, Blood in urine, Chronic pain, Chronic prostatitis, Chronic rhinitis, Chronic sinusitis, Cobalamin deficiency, Colostomy present (CMS/HCC), Depressive disorder, Disc disorder of lumbar region, Disease of prostate, Dysuria, Insomnia, Kidney stone, Low back pain, Malignant tumor of prostate (CMS/HCC), Nausea and vomiting, Neoplasm of uncertain behavior of skin, Neuralgia and neuritis, Neuropathy, Osteoarthritis, Pain in wrist, Pelvic congestion syndrome, Prostatitis, Radiculitis, Spinal stenosis, Tobacco dependence syndrome, Urinary incontinence, and Urinary tract infectious disease.    Past Surgical  History:  has a past surgical history that includes Colostomy; Finger surgery (08/06/2015); Injection of Medication; Transurethral resection of prostate (03/03/2012); Injection of Medication (09/02/2011); and Injection of Medication.    Family History: family history is not on file. He was adopted.     Social History:  reports that he has been smoking. He does not have any smokeless tobacco history on file. He reports that he does not drink alcohol.    Allergies: No Known Allergies    Medications:   Prior to Admission medications    Medication Sig Start Date End Date Taking? Authorizing Provider   DULoxetine (CYMBALTA) 30 MG capsule Take 30 mg by mouth Daily. 7/22/16   Anayeli Palmer MD   gabapentin (NEURONTIN) 800 MG tablet Take 800 mg by mouth Every 6 (Six) Hours. 7/22/16   Anayeli Palmer MD   HYDROcodone-acetaminophen (NORCO) 5-325 MG per tablet Take 1 tablet by mouth 2 (Two) Times a Day. 7/22/16   Anayeli Palmer MD   ondansetron (ZOFRAN) 8 MG tablet Take 8 mg by mouth Every 8 (Eight) Hours As Needed for nausea or vomiting. 7/22/16   Anayeli Palmer MD   traZODone (DESYREL) 150 MG tablet Take 300 mg by mouth Every Night. 7/22/16   Anayeli Palmer MD       Review of Systems:  Review of Systems   Unable to perform ROS: Intubated          Physical Exam:   Temp:  [91.1 °F (32.8 °C)-91.4 °F (33 °C)] 91.1 °F (32.8 °C)  Heart Rate:  [70-89] 80  Resp:  [12] 12  BP: ()/(16-59) 106/56  FiO2 (%):  [60 %] 60 %     Physical Exam   Constitutional: He appears well-developed and well-nourished. He is sedated and intubated.   HENT:   Head: Normocephalic and atraumatic.   Eyes: No scleral icterus.   Neck: No tracheal deviation present. No thyromegaly present.   Cardiovascular: Normal rate, regular rhythm, normal heart sounds and normal pulses.   Pulmonary/Chest: Breath sounds normal. He is intubated.   Abdominal: Soft. Normal appearance and bowel sounds are normal. He exhibits no distension.  There is no tenderness. There is no rigidity and no guarding.   Musculoskeletal: He exhibits no edema or deformity.   Neurological:   Intubated and sedated   Skin: There is pallor.        Psychiatric:   Intubated and sedated         Results Reviewed:  I have personally reviewed current lab, radiology, and data and agree with results.  Lab Results (last 24 hours)     Procedure Component Value Units Date/Time    Respiratory Culture - Sputum, ET Suction [[id number]] Collected:  01/08/20 1908    Specimen:  Sputum from ET Suction Updated:  01/1945     Gram Stain Many (4+) WBCs seen      Few (2+) Epithelial cells seen      Moderate (3+) Mixed bacterial wyatt    Norton Draw [[id number]] Collected:  01/08/20 1720    Specimen:  Blood Updated:  01/08/20 1830    Narrative:       The following orders were created for panel order Norton Draw.  Procedure                               Abnormality         Status                     ---------                               -----------         ------                     Light Blue Top[[id number]]                                   Final result               Green Top (Gel)[[id number]]                                  Final result               Lavender Top[[id number]]                                     Final result               Gold Top - SST[[id number]]                                   Final result                 Please view results for these tests on the individual orders.    Light Blue Top [[id number]] Collected:  01/08/20 1720    Specimen:  Blood Updated:  01/08/20 1830     Extra Tube hold for add-on     Comment: Auto resulted       Green Top (Gel) [[id number]] Collected:  01/08/20 1720    Specimen:  Blood Updated:  01/08/20 1830     Extra Tube Hold for add-ons.     Comment: Auto resulted.       Lavender Top [[id number]] Collected:  01/08/20 1720    Specimen:  Blood Updated:  01/08/20 1830     Extra Tube hold for add-on     Comment: Auto resulted       Gold Top - SST [[id number]]  Collected:  01/08/20 1720    Specimen:  Blood Updated:  01/08/20 1830     Extra Tube Hold for add-ons.     Comment: Auto resulted.       Manual Differential [[id number]]  (Abnormal) Collected:  01/08/20 1720    Specimen:  Blood Updated:  01/08/20 1812     Neutrophil % 80.0 %      Lymphocyte % 6.0 %      Monocyte % 5.0 %      Bands %  7.0 %      Metamyelocyte % 2.0 %      Neutrophils Absolute 26.77 10*3/mm3      Lymphocytes Absolute 1.85 10*3/mm3      Monocytes Absolute 1.54 10*3/mm3      Acanthocytes Mod/2+     Polychromasia Slight/1+     WBC Morphology Normal     Large Platelets Slight/1+    Urinalysis, Microscopic Only - Urine, Catheter [[id number]]  (Abnormal) Collected:  01/08/20 1711    Specimen:  Urine, Catheter Updated:  01/08/20 1801     RBC, UA 21-30 /HPF      WBC, UA 31-50 /HPF      Bacteria, UA 1+ /HPF      Squamous Epithelial Cells, UA 0-2 /HPF      Yeast, UA Small/1+ Budding Yeast /HPF      Hyaline Casts, UA 3-6 /LPF      Methodology Manual Light Microscopy    Urine Culture - Urine, Urine, Catheter [[id number]] Collected:  01/08/20 1711    Specimen:  Urine, Catheter Updated:  01/08/20 1801    Comprehensive Metabolic Panel [[id number]]  (Abnormal) Collected:  01/08/20 1720    Specimen:  Blood Updated:  01/08/20 1749     Glucose 325 mg/dL      BUN 82 mg/dL      Creatinine 2.36 mg/dL      Sodium 107 mmol/L      Potassium 4.8 mmol/L      Chloride 78 mmol/L      CO2 9.0 mmol/L      Calcium 6.9 mg/dL      Total Protein 3.5 g/dL      Albumin 1.60 g/dL      ALT (SGPT) 20 U/L      AST (SGOT) 21 U/L      Alkaline Phosphatase 56 U/L      Total Bilirubin 0.2 mg/dL      eGFR Non African Amer 27 mL/min/1.73      Globulin 1.9 gm/dL      A/G Ratio 0.8 g/dL      BUN/Creatinine Ratio 34.7     Anion Gap 20.0 mmol/L     Narrative:       GFR Normal >60  Chronic Kidney Disease <60  Kidney Failure <15      Lactic Acid, Plasma [[id number]]  (Abnormal) Collected:  01/08/20 1720    Specimen:  Blood Updated:  01/08/20 1747     Lactate  7.2 mmol/L     Lactic Acid, Reflex Timer (This will reflex a repeat order 3-3:15 hours after ordered.) [[id number]] Collected:  01/08/20 1720    Specimen:  Blood Updated:  01/08/20 1747    Magnesium [[id number]]  (Normal) Collected:  01/08/20 1720    Specimen:  Blood Updated:  01/08/20 1743     Magnesium 1.9 mg/dL     Troponin [[id number]]  (Normal) Collected:  01/08/20 1720    Specimen:  Blood Updated:  01/08/20 1743     Troponin T 0.012 ng/mL     Narrative:       Troponin T Reference Range:  <= 0.03 ng/mL-   Negative for AMI  >0.03 ng/mL-     Abnormal for myocardial necrosis.  Clinicians would have to utilize clinical acumen, EKG, Troponin and serial changes to determine if it is an Acute Myocardial Infarction or myocardial injury due to an underlying chronic condition.       Results may be falsely decreased if patient taking Biotin.      BNP [[id number]]  (Normal) Collected:  01/08/20 1720    Specimen:  Blood Updated:  01/08/20 1741     proBNP 821.2 pg/mL     Narrative:       Among patients with dyspnea, NT-proBNP is highly sensitive for the detection of acute congestive heart failure. In addition NT-proBNP of <300 pg/ml effectively rules out acute congestive heart failure with 99% negative predictive value.    Results may be falsely decreased if patient taking Biotin.      Protime-INR [[id number]]  (Abnormal) Collected:  01/08/20 1720    Specimen:  Blood Updated:  01/08/20 1741     Protime 18.7 Seconds      INR 1.59    Narrative:       Therapeutic range for most indications is 2.0-3.0 INR,  or 2.5-3.5 for mechanical heart valves.    Urinalysis With Culture If Indicated - Urine, Catheter [[id number]]  (Abnormal) Collected:  01/08/20 1711    Specimen:  Urine, Catheter Updated:  01/08/20 1732     Color, UA Yellow     Appearance, UA Cloudy     pH, UA 5.5     Specific Gravity, UA 1.016     Glucose, UA Negative     Ketones, UA Negative     Bilirubin, UA Negative     Blood, UA Large (3+)     Protein, UA Negative     Leuk  Esterase, UA Moderate (2+)     Nitrite, UA Positive     Urobilinogen, UA 0.2 E.U./dL    CBC & Differential [[id number]] Collected:  01/08/20 1720    Specimen:  Blood Updated:  01/08/20 1729    Narrative:       The following orders were created for panel order CBC & Differential.  Procedure                               Abnormality         Status                     ---------                               -----------         ------                     CBC Auto Differential[[id number]]        Abnormal            Final result                 Please view results for these tests on the individual orders.    CBC Auto Differential [[id number]]  (Abnormal) Collected:  01/08/20 1720    Specimen:  Blood Updated:  01/08/20 1729     WBC 30.77 10*3/mm3      RBC 3.14 10*6/mm3      Hemoglobin 9.7 g/dL      Hematocrit 26.7 %      MCV 85.0 fL      MCH 30.9 pg      MCHC 36.3 g/dL      RDW 11.8 %      RDW-SD 36.2 fl      MPV 9.9 fL      Platelets 235 10*3/mm3     Blood Gas, Arterial [[id number]]  (Abnormal) Collected:  01/08/20 1713    Specimen:  Arterial Blood Updated:  01/08/20 1727     Site Arterial Line     Olaf's Test N/A     pH, Arterial 6.967 pH units      Comment: 85 Value below critical limit        pCO2, Arterial 35.8 mm Hg      pO2, Arterial 203.0 mm Hg      Comment: 83 Value above reference range        HCO3, Arterial 8.2 mmol/L      Comment: 84 Value below reference range        Base Excess, Arterial -22.5 mmol/L      Comment: 84 Value below reference range        O2 Saturation, Arterial 98.7 %      Barometric Pressure for Blood Gas 757 mmHg      Modality Ventilator     FIO2 60 %      Ventilator Mode AC     Set Tidal Volume 500     Set Mech Resp Rate 12.0     PEEP 5.0     Collected by      Comment: Meter: [id number]     :  [id number]           Imaging Results (Last 24 Hours)     Procedure Component Value Units Date/Time    XR Chest 1 View [[id number]] Collected:  01/08/20 1715     Updated:  01/08/20 1733     Narrative:       PROCEDURE: Portable chest x-ray    TECHNIQUE: Single AP view of the chest    COMPARISON: 7/13/2010    HISTORY: Intubated    FINDINGS:     Life-support devices: Endotracheal tube tip projects  approximately 4 cm above the makeda. A defibrillator pad projects  over the right side of the chest.    Lungs/pleura: The lung apices are not included on the film. The  visualized portions of the lungs appear clear. No pneumothorax or  pleural effusion.    Heart, hilar and mediastinal structures: Cardiac silhouette is  normal in size. Thoracic aorta contains atherosclerotic  calcification.      Impression:       CONCLUSION:  Endotracheal tube tip projects 4 cm above the makeda.    Electronically signed by:  Cabrera Jaimes MD  1/8/2020 5:28 PM CST  Workstation: [id number]            Assessment:    Active Hospital Problems    Diagnosis   • Gastrointestinal hemorrhage             Plan:  1.  Cardiac arrest: Patient had PEA cardiac arrest.  He has returned to spontaneous circulation.  Currently has some spontaneous movement although there is question as to whether is purposeful or not.  He does not meet criteria for therapeutic hypothermia.  Continue to support.  2.  GI bleed.  Patient has dark black blood in his colostomy bag.  He has gotten 2 units packed red blood cells.  Hemoglobin is greater than 9.  Continue to monitor closely.  3.  Severe metabolic acidosis with a pH of 6.9.  Patient received 1 amp of sodium bicarbonate.  Will need tomorrow.  We will give those tonight.  4.  Severe hyponatremia: Sodium is 107.  Unclear etiology.  It is also unclear to me exactly when this lab was drawn.  He was given 3 L normal saline during his initial resuscitation.  I do not know if this was drawn before, during, or after his resuscitation.  Will need to follow-up after fluid equilibrates.  5.  Possible urinary tract infection: This is likely not the cause of his current situation, but will get culture and treat empirically  nonetheless.  6.  Elevated lactate: Likely secondary to poor tissue perfusion during his arrest and iman-arrest hypotension.  7.  Probable hypoxic encephalopathy    I had a lengthy discussion with the patient's daughter and 3 granddaughters.  I informed them of the patient's current situation, prognosis, likely course, and treatment options.  They verbalized understanding.  They stated they wanted to continue to be aggressive at this point.  They understand that his situation is very tenuous, and the likelihood of significant hypoxic encephalopathy is very high.  This is, of course, if he survives this event which is still in significant question.    I discussed the patient's findings and my recommendations with: His family    Approximately 55 minutes of critical care time were spent managing the patient exclusive of billable procedures.             This document has been electronically signed by Eduin Quevedo MD on January 8, 2020 8:11 PM

## 2020-01-09 NOTE — CONSULTS
SUBJECTIVE:   1/8/2020    Name: Gianni Terrazas  DOD: 1945    REASON FOR CONSULT: Melena    Chief Complaint:     Chief Complaint   Patient presents with   • Cardiac Arrest       Subjective     Patient is 74 y.o. male with history of ulcerative colitis who presented to the emergency room after having a large amount of melanotic stool for the past 24 hours patient became increasingly weak and pale.  Patient was initially seen by EMS but refused care.  After approximately 1 hour EMS was called again patient was unresponsive at home patient was found to be in PEA CPR was started patient was intubated and brought to the hospital patient has a history of colectomy secondary to ulcerative colitis.  Patient is unknown to GI here     ROS/HISTORY/ CURRENT MEDICATIONS/OBJECTIVE/VS/PE:   Review of Systems:   Review of Systems   Unable to perform ROS: Intubated       History:     Past Medical History:   Diagnosis Date   • Actinic keratosis    • Acute sinusitis    • Anxiety state    • Benign prostate hyperplasia     s/p TURP   • Blood in urine     microscopic, UA dipstick done in office   • Chronic pain     previously controlled with Neurontin   • Chronic prostatitis    • Chronic rhinitis    • Chronic sinusitis    • Cobalamin deficiency     starting b12 today recheck weekly 4 weeks   • Colostomy present (CMS/HCC)    • Depressive disorder    • Disc disorder of lumbar region    • Disease of prostate     no $ for f/u   • Dysuria     check a ua   • Insomnia    • Kidney stone    • Low back pain    • Malignant tumor of prostate (CMS/HCC)     with surger at [facility]   • Nausea and vomiting    • Neoplasm of uncertain behavior of skin    • Neuralgia and neuritis     Neuralgia, neuritis, and radiculitis, unspecified   • Neuropathy    • Osteoarthritis     multiple sites   • Pain in wrist    • Pelvic congestion syndrome    • Prostatitis    • Radiculitis    • Spinal stenosis    • Tobacco dependence syndrome    • Urinary  incontinence     s/p turp   • Urinary tract infectious disease      Past Surgical History:   Procedure Laterality Date   • COLOSTOMY     • CYSTOSCOPY TRANSURETHRAL RESECTION OF PROSTATE  03/03/2012   • FINGER SURGERY  08/06/2015    Arthroplasty with Orthosphere ceramic spherical implant size 13 mm. Arthritis base of thumb joint   • INJECTION OF MEDICATION      Pheneragan (1)   • INJECTION OF MEDICATION  09/02/2011    Rocephin (1)   • INJECTION OF MEDICATION      Toradol (3)     Family History   Adopted: Yes     Social History     Tobacco Use   • Smoking status: Current Every Day Smoker   Substance Use Topics   • Alcohol use: No   • Drug use: Not on file     Medications Prior to Admission   Medication Sig Dispense Refill Last Dose   • DULoxetine (CYMBALTA) 30 MG capsule Take 30 mg by mouth Daily.   Unknown   • gabapentin (NEURONTIN) 800 MG tablet Take 800 mg by mouth Every 6 (Six) Hours.   Unknown   • HYDROcodone-acetaminophen (NORCO) 5-325 MG per tablet Take 1 tablet by mouth 2 (Two) Times a Day.   Unknown   • ondansetron (ZOFRAN) 8 MG tablet Take 8 mg by mouth Every 8 (Eight) Hours As Needed for nausea or vomiting.   Unknown   • traZODone (DESYREL) 150 MG tablet Take 300 mg by mouth Every Night.   Unknown     Allergies:  Patient has no known allergies.    I have reviewed the patient's medical history, surgical history and family history in the available medical record system.     Current Medications:     Current Facility-Administered Medications   Medication Dose Route Frequency Provider Last Rate Last Dose   • midazolam (VERSED) 50 mg in sodium chloride 0.9 % 100 mL (0.5 mg/mL) infusion  1-10 mg/hr Intravenous Titrated Eduin Quevedo MD       • sodium chloride 0.9 % bolus 2,100 mL  30 mL/kg Intravenous Once Eduin Quevedo MD       • sodium chloride 0.9 % flush 10 mL  10 mL Intravenous PRN Eduin Quevedo MD   10 mL at 01/08/20 1726   • sodium chloride 0.9 % flush 10 mL  10 mL Intravenous Q12H Sae  Eduin BEAULIEU MD       • sodium chloride 0.9 % flush 10 mL  10 mL Intravenous PRN Eduin Quevedo MD       • sodium chloride 0.9 % infusion  150 mL/hr Intravenous Continuous Eduin Quevedo  mL/hr at 01/08/20 1809 150 mL/hr at 01/08/20 1809       Objective     Physical Exam:   Temp:  [91.1 °F (32.8 °C)-91.4 °F (33 °C)] 91.1 °F (32.8 °C)  Heart Rate:  [70-89] 80  Resp:  [12] 12  BP: ()/(16-59) 106/56  FiO2 (%):  [60 %] 60 %    Physical Exam:          Eyes:            Lids and lashes normal, conjunctivae and sclerae normal, no   icterus, no pallor, corneas clear, PERRLA   Ears:    Ears appear intact with no abnormalities noted   Throat:   No oral lesions, no thrush, oral mucosa moist   Neck:   No adenopathy, supple, trachea midline, no thyromegaly, no     carotid bruit, no JVD   Back:     No kyphosis present, no scoliosis present, no skin lesions,       erythema or scars, no tenderness to percussion or                   palpation,   range of motion normal   Lungs:     Clear to auscultation,respirations regular, even and                   unlabored    Heart:    Regular rhythm and normal rate, normal S1 and S2, no            murmur, no gallop, no rub, no click   Breast Exam:    Deferred   Abdomen:     Normal bowel sounds, no masses, no organomegaly, soft        non-tender, non-distended, no guarding, no rebound                 tenderness   Genitalia:    Deferred   Extremities:   Moves all extremities well, no edema, no cyanosis, no              redness   Pulses:   Pulses palpable and equal bilaterally   Skin:   No bleeding, bruising or rash   Lymph nodes:   No palpable adenopathy          Results Review:     Lab Results   Component Value Date    WBC 30.77 (C) 01/08/2020    WBC 6 09/06/2019    WBC 5.2 01/18/2019    HGB 9.7 (L) 01/08/2020    HGB 14.2 (L) 09/06/2019    HGB 14.1 (L) 01/18/2019    HCT 26.7 (L) 01/08/2020    HCT 43.5 09/06/2019    HCT 42.1 (L) 01/18/2019     01/08/2020      09/06/2019     01/18/2019     Results from last 7 days   Lab Units 01/08/20  1720   ALK PHOS U/L 56   ALT (SGPT) U/L 20   AST (SGOT) U/L 21     Results from last 7 days   Lab Units 01/08/20  1720   BILIRUBIN mg/dL 0.2   ALK PHOS U/L 56     No results found for: LIPASE  Lab Results   Component Value Date    INR 1.59 (H) 01/08/2020    INR 1.00 10/30/2019    INR 1 01/18/2019         Radiology Review:  Imaging Results (Last 72 Hours)     Procedure Component Value Units Date/Time    XR Chest 1 View [[id number]] Collected:  01/08/20 1715     Updated:  01/08/20 1733    Narrative:       PROCEDURE: Portable chest x-ray    TECHNIQUE: Single AP view of the chest    COMPARISON: 7/13/2010    HISTORY: Intubated    FINDINGS:     Life-support devices: Endotracheal tube tip projects  approximately 4 cm above the makeda. A defibrillator pad projects  over the right side of the chest.    Lungs/pleura: The lung apices are not included on the film. The  visualized portions of the lungs appear clear. No pneumothorax or  pleural effusion.    Heart, hilar and mediastinal structures: Cardiac silhouette is  normal in size. Thoracic aorta contains atherosclerotic  calcification.      Impression:       CONCLUSION:  Endotracheal tube tip projects 4 cm above the makeda.    Electronically signed by:  Cabrera Jaimes MD  1/8/2020 5:28 PM CST  Workstation: [id number]          I reviewed the patient's new clinical results.    I reviewed the patient's new imaging results and agree with the interpretation.     ASSESSMENT/PLAN:   ASSESSMENT: Patient presented to the emergency room with melanotic stools.  Patient with recent cardiac arrest and hypotension.  Patient with markedly elevated white count.  Patient continues to have low blood pressure.  Patient has some coffee-ground material from the NG tube but no bright red blood.  At this time patient is too unstable for endoscopy but his condition improves patient will need evaluation of because of  melanotic stools.  Patient with marked elevation in lactic acid and white count possibly secondary to sepsis versus prolonged period of hypoxia    PLAN: #1 continue patient on a proton pump inhibitor  #2 continue to monitor patient's hemoglobin transfuse 2 units packed RBCs if hemoglobin less than 7  #3 patient will need upper endoscopy when patient's condition markedly improves    The risks, benefits, and alternatives of this procedure have been discussed with the patient or the responsible party. The patient understands and agrees to proceed.         Fercho Davison MD  01/08/20  8:19 PM         This document has been electronically signed by Fercho Davison MD on January 8, 2020 8:19 PM

## 2020-01-10 LAB
ABO + RH BLD: NORMAL
ABO + RH BLD: NORMAL
BACTERIA SPEC AEROBE CULT: NO GROWTH
BACTERIA SPEC RESP CULT: NORMAL
BH BB BLOOD EXPIRATION DATE: NORMAL
BH BB BLOOD EXPIRATION DATE: NORMAL
BH BB BLOOD TYPE BARCODE: 5100
BH BB BLOOD TYPE BARCODE: 5100
BH BB DISPENSE STATUS: NORMAL
BH BB DISPENSE STATUS: NORMAL
BH BB PRODUCT CODE: NORMAL
BH BB PRODUCT CODE: NORMAL
BH BB UNIT NUMBER: NORMAL
BH BB UNIT NUMBER: NORMAL
CROSSMATCH INTERPRETATION: NORMAL
CROSSMATCH INTERPRETATION: NORMAL
GRAM STN SPEC: NORMAL
UNIT  ABO: NORMAL
UNIT  ABO: NORMAL
UNIT  RH: NORMAL
UNIT  RH: NORMAL

## 2020-01-11 LAB — BACTERIA BLD CULT: ABNORMAL

## 2020-01-13 LAB — BACTERIA SPEC AEROBE CULT: NORMAL

## 2020-01-14 LAB
BACTERIA SPEC AEROBE CULT: ABNORMAL
GRAM STN SPEC: ABNORMAL

## 2020-01-14 NOTE — DISCHARGE SUMMARY
[facility] Medicine Services  DEATH SUMMARY       Date of Admission: 1/8/2020  Date of Death:  1/9/2020 at 1220  Primary Care Physician: Dayday Dent MD    Presenting Problem/History of Present Illness:  Hyponatremia [E87.1]  Hypotension, unspecified hypotension type [I95.9]  Gastrointestinal hemorrhage, unspecified gastrointestinal hemorrhage type [K92.2]  Sepsis, due to unspecified organism, unspecified whether acute organ dysfunction present (CMS/[facility]) [A41.9]     Final Death Diagnoses:  Active Hospital Problems    Diagnosis   • Gastrointestinal hemorrhage       Consults:   Consults     Date and Time Order Name Status Description    1/8/2020 1806 Gastroenterology (on-call MD unless specified) Completed           Pertinent Test Results:   Lab Results (last 72 hours)     Procedure Component Value Units Date/Time    SCANNED - LABS [[id number]] Resulted:  01/08/20      Updated:  01/09/20 1001    Blood Gas, Arterial [[id number]]  (Abnormal) Collected:  01/09/20 0430    Specimen:  Arterial Blood Updated:  01/09/20 0439     Site Right Radial     Olaf's Test N/A     pH, Arterial 7.283 pH units      Comment: 84 Value below reference range        pCO2, Arterial 25.0 mm Hg      Comment: 84 Value below reference range        pO2, Arterial 126.0 mm Hg      Comment: 83 Value above reference range        HCO3, Arterial 11.8 mmol/L      Comment: 84 Value below reference range        Base Excess, Arterial -12.7 mmol/L      Comment: 84 Value below reference range        O2 Saturation, Arterial 98.4 %      Barometric Pressure for Blood Gas 755 mmHg      Modality Ventilator     FIO2 30 %      Ventilator Mode AC     Set Tidal Volume 500     Set Mech Resp Rate 12.0     PEEP 5.0     PIP 23 cmH2O      Comment: Meter: [id number]     :  [id number]        Collected by JOSH GARCIA    Basic Metabolic Panel [[id number]]  (Abnormal) Collected:  01/09/20 0326    Specimen:  Blood Updated:   01/09/20 0402     Glucose 108 mg/dL      BUN 71 mg/dL      Creatinine 2.11 mg/dL      Sodium 118 mmol/L      Potassium 5.5 mmol/L      Chloride 86 mmol/L      CO2 16.0 mmol/L      Calcium 7.8 mg/dL      eGFR Non African Amer 31 mL/min/1.73      BUN/Creatinine Ratio 33.6     Anion Gap 16.0 mmol/L     Narrative:       GFR Normal >60  Chronic Kidney Disease <60  Kidney Failure <15      CBC & Differential [[id number]] Collected:  01/09/20 0326    Specimen:  Blood Updated:  01/09/20 0345    Narrative:       The following orders were created for panel order CBC & Differential.  Procedure                               Abnormality         Status                     ---------                               -----------         ------                     CBC Auto Differential[[id number]]        Abnormal            Final result                 Please view results for these tests on the individual orders.    CBC Auto Differential [[id number]]  (Abnormal) Collected:  01/09/20 0326    Specimen:  Blood Updated:  01/09/20 0345     WBC 29.91 10*3/mm3      RBC 5.84 10*6/mm3      Hemoglobin 18.0 g/dL      Hematocrit 48.9 %      MCV 83.7 fL      MCH 30.8 pg      MCHC 36.8 g/dL      RDW 12.5 %      RDW-SD 37.6 fl      MPV 9.6 fL      Platelets 231 10*3/mm3      Neutrophil % 94.6 %      Lymphocyte % 1.2 %      Monocyte % 2.6 %      Eosinophil % 0.0 %      Basophil % 0.1 %      Immature Grans % 1.5 %      Neutrophils, Absolute 28.31 10*3/mm3      Lymphocytes, Absolute 0.36 10*3/mm3      Monocytes, Absolute 0.77 10*3/mm3      Eosinophils, Absolute 0.00 10*3/mm3      Basophils, Absolute 0.02 10*3/mm3      Immature Grans, Absolute 0.45 10*3/mm3      nRBC 0.1 /100 WBC     Blood Gas, Arterial [[id number]]  (Abnormal) Collected:  01/08/20 2340    Specimen:  Arterial Blood Updated:  01/08/20 2347     Site Left Radial     Olaf's Test N/A     pH, Arterial 7.311 pH units      Comment: 84 Value below reference range        pCO2, Arterial 27.1 mm Hg       Comment: 84 Value below reference range        pO2, Arterial 276.0 mm Hg      Comment: 83 Value above reference range        HCO3, Arterial 13.7 mmol/L      Comment: 84 Value below reference range        Base Excess, Arterial -10.7 mmol/L      Comment: 84 Value below reference range        O2 Saturation, Arterial 99.7 %      Comment: 83 Value above reference range        Barometric Pressure for Blood Gas 755 mmHg      Modality Ventilator     FIO2 60 %      Ventilator Mode AC     Set Mech Resp Rate 12.0     PEEP 5.0     Collected by JOSH GARCIA     Comment: Meter: [id number]     :  [id number]       Lactic Acid, Reflex [[id number]]  (Abnormal) Collected:  01/08/20 2133    Specimen:  Blood Updated:  01/08/20 2158     Lactate 4.6 mmol/L     Lactic Acid, Reflex Timer (This will reflex a repeat order 3-3:15 hours after ordered.) [[id number]] Collected:  01/08/20 1720    Specimen:  Blood Updated:  01/08/20 2100     Extra Tube Hold for add-ons.     Comment: Auto resulted.       [place] Draw [[id number]] Collected:  01/08/20 1720    Specimen:  Blood Updated:  01/08/20 1830    Narrative:       The following orders were created for panel order [place] Draw.  Procedure                               Abnormality         Status                     ---------                               -----------         ------                     Light Blue Top[[id number]]                                   Final result               Green Top (Gel)[[id number]]                                  Final result               Lavender Top[[id number]]                                     Final result               Gold Top - SST[[id number]]                                   Final result                 Please view results for these tests on the individual orders.    Light Blue Top [[id number]] Collected:  01/08/20 1720    Specimen:  Blood Updated:  01/08/20 1830     Extra Tube hold for add-on     Comment: Auto resulted       Green Top (Gel) [[id number]]  Collected:  01/08/20 1720    Specimen:  Blood Updated:  01/08/20 1830     Extra Tube Hold for add-ons.     Comment: Auto resulted.       Lavender Top [[id number]] Collected:  01/08/20 1720    Specimen:  Blood Updated:  01/08/20 1830     Extra Tube hold for add-on     Comment: Auto resulted       Gold Top - SST [[id number]] Collected:  01/08/20 1720    Specimen:  Blood Updated:  01/08/20 1830     Extra Tube Hold for add-ons.     Comment: Auto resulted.       Manual Differential [[id number]]  (Abnormal) Collected:  01/08/20 1720    Specimen:  Blood Updated:  01/08/20 1812     Neutrophil % 80.0 %      Lymphocyte % 6.0 %      Monocyte % 5.0 %      Bands %  7.0 %      Metamyelocyte % 2.0 %      Neutrophils Absolute 26.77 10*3/mm3      Lymphocytes Absolute 1.85 10*3/mm3      Monocytes Absolute 1.54 10*3/mm3      Acanthocytes Mod/2+     Polychromasia Slight/1+     WBC Morphology Normal     Large Platelets Slight/1+    Urinalysis, Microscopic Only - Urine, Catheter [[id number]]  (Abnormal) Collected:  01/08/20 1711    Specimen:  Urine, Catheter Updated:  01/08/20 1801     RBC, UA 21-30 /HPF      WBC, UA 31-50 /HPF      Bacteria, UA 1+ /HPF      Squamous Epithelial Cells, UA 0-2 /HPF      Yeast, UA Small/1+ Budding Yeast /HPF      Hyaline Casts, UA 3-6 /LPF      Methodology Manual Light Microscopy    Comprehensive Metabolic Panel [[id number]]  (Abnormal) Collected:  01/08/20 1720    Specimen:  Blood Updated:  01/08/20 1749     Glucose 325 mg/dL      BUN 82 mg/dL      Creatinine 2.36 mg/dL      Sodium 107 mmol/L      Potassium 4.8 mmol/L      Chloride 78 mmol/L      CO2 9.0 mmol/L      Calcium 6.9 mg/dL      Total Protein 3.5 g/dL      Albumin 1.60 g/dL      ALT (SGPT) 20 U/L      AST (SGOT) 21 U/L      Alkaline Phosphatase 56 U/L      Total Bilirubin 0.2 mg/dL      eGFR Non African Amer 27 mL/min/1.73      Globulin 1.9 gm/dL      A/G Ratio 0.8 g/dL      BUN/Creatinine Ratio 34.7     Anion Gap 20.0 mmol/L     Narrative:       GFR  Normal >60  Chronic Kidney Disease <60  Kidney Failure <15      Lactic Acid, Plasma [[id number]]  (Abnormal) Collected:  01/08/20 1720    Specimen:  Blood Updated:  01/08/20 1747     Lactate 7.2 mmol/L     Magnesium [[id number]]  (Normal) Collected:  01/08/20 1720    Specimen:  Blood Updated:  01/08/20 1743     Magnesium 1.9 mg/dL     Troponin [[id number]]  (Normal) Collected:  01/08/20 1720    Specimen:  Blood Updated:  01/08/20 1743     Troponin T 0.012 ng/mL     Narrative:       Troponin T Reference Range:  <= 0.03 ng/mL-   Negative for AMI  >0.03 ng/mL-     Abnormal for myocardial necrosis.  Clinicians would have to utilize clinical acumen, EKG, Troponin and serial changes to determine if it is an Acute Myocardial Infarction or myocardial injury due to an underlying chronic condition.       Results may be falsely decreased if patient taking Biotin.      BNP [[id number]]  (Normal) Collected:  01/08/20 1720    Specimen:  Blood Updated:  01/08/20 1741     proBNP 821.2 pg/mL     Narrative:       Among patients with dyspnea, NT-proBNP is highly sensitive for the detection of acute congestive heart failure. In addition NT-proBNP of <300 pg/ml effectively rules out acute congestive heart failure with 99% negative predictive value.    Results may be falsely decreased if patient taking Biotin.      Protime-INR [[id number]]  (Abnormal) Collected:  01/08/20 1720    Specimen:  Blood Updated:  01/08/20 1741     Protime 18.7 Seconds      INR 1.59    Narrative:       Therapeutic range for most indications is 2.0-3.0 INR,  or 2.5-3.5 for mechanical heart valves.    Urinalysis With Culture If Indicated - Urine, Catheter [[id number]]  (Abnormal) Collected:  01/08/20 1711    Specimen:  Urine, Catheter Updated:  01/08/20 1732     Color, UA Yellow     Appearance, UA Cloudy     pH, UA 5.5     Specific Gravity, UA 1.016     Glucose, UA Negative     Ketones, UA Negative     Bilirubin, UA Negative     Blood, UA Large (3+)     Protein, UA  Negative     Leuk Esterase, UA Moderate (2+)     Nitrite, UA Positive     Urobilinogen, UA 0.2 E.U./dL    CBC & Differential [[id number]] Collected:  01/08/20 1720    Specimen:  Blood Updated:  01/08/20 1729    Narrative:       The following orders were created for panel order CBC & Differential.  Procedure                               Abnormality         Status                     ---------                               -----------         ------                     CBC Auto Differential[[id number]]        Abnormal            Final result                 Please view results for these tests on the individual orders.    CBC Auto Differential [[id number]]  (Abnormal) Collected:  01/08/20 1720    Specimen:  Blood Updated:  01/08/20 1729     WBC 30.77 10*3/mm3      RBC 3.14 10*6/mm3      Hemoglobin 9.7 g/dL      Hematocrit 26.7 %      MCV 85.0 fL      MCH 30.9 pg      MCHC 36.3 g/dL      RDW 11.8 %      RDW-SD 36.2 fl      MPV 9.9 fL      Platelets 235 10*3/mm3     Blood Gas, Arterial [[id number]]  (Abnormal) Collected:  01/08/20 1713    Specimen:  Arterial Blood Updated:  01/08/20 1727     Site Arterial Line     Olaf's Test N/A     pH, Arterial 6.967 pH units      Comment: 85 Value below critical limit        pCO2, Arterial 35.8 mm Hg      pO2, Arterial 203.0 mm Hg      Comment: 83 Value above reference range        HCO3, Arterial 8.2 mmol/L      Comment: 84 Value below reference range        Base Excess, Arterial -22.5 mmol/L      Comment: 84 Value below reference range        O2 Saturation, Arterial 98.7 %      Barometric Pressure for Blood Gas 757 mmHg      Modality Ventilator     FIO2 60 %      Ventilator Mode AC     Set Tidal Volume 500     Set [facility] Resp Rate 12.0     PEEP 5.0     Collected by      Comment: Meter: [id number]     :  [id number]               Hospital Course:  The patient is a 74 y.o. male who presented to [facility] with cardiac arrest.  He was aggressively resuscitated  in the emergency department and had return of spontaneous circulation.  He remained critically ill throughout the day.  Gastroenterology was consulted due to acute blood loss anemia, and he was felt to be entirely unstable for GI work-up.  Over the course of the night of admission he had 3 more episodes of PEA cardiac arrest.  He had return of spontaneous circulation after hours.  However, he did require epinephrine drip after the last 1.  He remained markedly hemodynamically unstable.  Lengthy conversation was had with the family.  He had significant encephalopathic changes, metabolic derangement and severe hemodynamic instability.  The decision was made to transition to comfort measures.  This was done and patient  at 1220 on .              This document has been electronically signed by Eduin Quevedo MD on 2020 1:43 PM      Time: 25 min

## 2020-03-19 LAB
CYTO UR: NORMAL
LAB AP CASE REPORT: NORMAL
LAB AP CLINICAL INFORMATION: NORMAL
LAB AP DIAGNOSIS COMMENT: NORMAL
LAB AP PROVISIONAL DIAGNOSIS: NORMAL
PATH REPORT.FINAL DX SPEC: NORMAL
PATH REPORT.GROSS SPEC: NORMAL

## 2023-05-18 NOTE — TELEPHONE ENCOUNTER
----- Message from Rafat Faustin MD sent at 4/19/2018  2:18 PM CDT -----  Regarding: FW: ACCEOT INTO PRACTICE  Contact: [phone]  Not taking Pt's dismissed from [facility], I am part of [facility] practice. Sorry  ----- Message -----  From: Brenden Main Rep  Sent: 4/19/2018  10:18 AM  To: Rafat Faustin MD  Subject: ACCEOT INTO PRACTICE                             PT WAS DISMISSED FROM [facility] IN [place] AND NOW SEES DR COTTO IN [place]. WANTS TO SEE YOU WILL HAVE TO DEPEND ON SOMEONE TO BRING HIM HERE      
Spoke with patient letting him know that Dr. Faustin will not accept into practice since he was dismissed from [facility]. Patient started to explain the reason for his dismissal saying that the resident lied about him refusing a drug screen for a medication that he was prescribed.  States that he was told that his dismissal would be removed from his chart and if it is still there stated that  would be under a lawsuit.  Explained that he needed to speak with [facility] in [place] regarding this issue.  
0 (no pain/absence of nonverbal indicators of pain)